# Patient Record
Sex: MALE | Race: BLACK OR AFRICAN AMERICAN | Employment: UNEMPLOYED | URBAN - METROPOLITAN AREA
[De-identification: names, ages, dates, MRNs, and addresses within clinical notes are randomized per-mention and may not be internally consistent; named-entity substitution may affect disease eponyms.]

---

## 2018-06-09 ENCOUNTER — APPOINTMENT (OUTPATIENT)
Dept: GENERAL RADIOLOGY | Age: 78
DRG: 310 | End: 2018-06-09
Attending: EMERGENCY MEDICINE
Payer: MEDICARE

## 2018-06-09 ENCOUNTER — HOSPITAL ENCOUNTER (INPATIENT)
Age: 78
LOS: 1 days | Discharge: LEFT BEFORE DISCHARGE | DRG: 310 | End: 2018-06-09
Attending: EMERGENCY MEDICINE | Admitting: INTERNAL MEDICINE
Payer: MEDICARE

## 2018-06-09 VITALS
HEART RATE: 73 BPM | SYSTOLIC BLOOD PRESSURE: 125 MMHG | RESPIRATION RATE: 16 BRPM | TEMPERATURE: 97.8 F | DIASTOLIC BLOOD PRESSURE: 83 MMHG | OXYGEN SATURATION: 96 %

## 2018-06-09 DIAGNOSIS — I48.91 ATRIAL FIBRILLATION WITH RVR (HCC): Primary | ICD-10-CM

## 2018-06-09 LAB
ALBUMIN SERPL-MCNC: 3.4 G/DL (ref 3.4–5)
ALBUMIN/GLOB SERPL: 0.7 {RATIO} (ref 0.8–1.7)
ALP SERPL-CCNC: 90 U/L (ref 45–117)
ALT SERPL-CCNC: 25 U/L (ref 16–61)
ANION GAP SERPL CALC-SCNC: 7 MMOL/L (ref 3–18)
AST SERPL-CCNC: 35 U/L (ref 15–37)
BASOPHILS # BLD: 0 K/UL (ref 0–0.1)
BASOPHILS NFR BLD: 0 % (ref 0–2)
BILIRUB SERPL-MCNC: 0.5 MG/DL (ref 0.2–1)
BUN SERPL-MCNC: 22 MG/DL (ref 7–18)
BUN/CREAT SERPL: 17 (ref 12–20)
CALCIUM SERPL-MCNC: 9 MG/DL (ref 8.5–10.1)
CHLORIDE SERPL-SCNC: 107 MMOL/L (ref 100–108)
CHOLEST SERPL-MCNC: 174 MG/DL
CK MB CFR SERPL CALC: NORMAL % (ref 0–4)
CK MB SERPL-MCNC: <1 NG/ML (ref 5–25)
CK SERPL-CCNC: 110 U/L (ref 39–308)
CO2 SERPL-SCNC: 24 MMOL/L (ref 21–32)
CREAT SERPL-MCNC: 1.31 MG/DL (ref 0.6–1.3)
DIFFERENTIAL METHOD BLD: ABNORMAL
EOSINOPHIL # BLD: 0 K/UL (ref 0–0.4)
EOSINOPHIL NFR BLD: 0 % (ref 0–5)
ERYTHROCYTE [DISTWIDTH] IN BLOOD BY AUTOMATED COUNT: 15.7 % (ref 11.6–14.5)
GLOBULIN SER CALC-MCNC: 4.9 G/DL (ref 2–4)
GLUCOSE SERPL-MCNC: 135 MG/DL (ref 74–99)
HBA1C MFR BLD: 6 % (ref 4.2–5.6)
HCT VFR BLD AUTO: 42.9 % (ref 36–48)
HDLC SERPL-MCNC: 59 MG/DL (ref 40–60)
HDLC SERPL: 2.9 {RATIO} (ref 0–5)
HGB BLD-MCNC: 14.7 G/DL (ref 13–16)
INR PPP: 2.3 (ref 0.8–1.2)
LDLC SERPL CALC-MCNC: 94.6 MG/DL (ref 0–100)
LIPID PROFILE,FLP: NORMAL
LYMPHOCYTES # BLD: 1.6 K/UL (ref 0.9–3.6)
LYMPHOCYTES NFR BLD: 11 % (ref 21–52)
MAGNESIUM SERPL-MCNC: 2.2 MG/DL (ref 1.6–2.6)
MCH RBC QN AUTO: 29.3 PG (ref 24–34)
MCHC RBC AUTO-ENTMCNC: 34.3 G/DL (ref 31–37)
MCV RBC AUTO: 85.6 FL (ref 74–97)
MONOCYTES # BLD: 0.6 K/UL (ref 0.05–1.2)
MONOCYTES NFR BLD: 4 % (ref 3–10)
NEUTS SEG # BLD: 11.7 K/UL (ref 1.8–8)
NEUTS SEG NFR BLD: 85 % (ref 40–73)
PLATELET # BLD AUTO: 308 K/UL (ref 135–420)
PMV BLD AUTO: 9.8 FL (ref 9.2–11.8)
POTASSIUM SERPL-SCNC: 4.8 MMOL/L (ref 3.5–5.5)
PROT SERPL-MCNC: 8.3 G/DL (ref 6.4–8.2)
PROTHROMBIN TIME: 24.8 SEC (ref 11.5–15.2)
RBC # BLD AUTO: 5.01 M/UL (ref 4.7–5.5)
SODIUM SERPL-SCNC: 138 MMOL/L (ref 136–145)
TRIGL SERPL-MCNC: 102 MG/DL (ref ?–150)
TROPONIN I SERPL-MCNC: <0.02 NG/ML (ref 0–0.04)
TSH SERPL DL<=0.05 MIU/L-ACNC: 2.44 UIU/ML (ref 0.36–3.74)
VLDLC SERPL CALC-MCNC: 20.4 MG/DL
WBC # BLD AUTO: 13.8 K/UL (ref 4.6–13.2)

## 2018-06-09 PROCEDURE — 85025 COMPLETE CBC W/AUTO DIFF WBC: CPT | Performed by: EMERGENCY MEDICINE

## 2018-06-09 PROCEDURE — 74011000258 HC RX REV CODE- 258: Performed by: EMERGENCY MEDICINE

## 2018-06-09 PROCEDURE — 96367 TX/PROPH/DG ADDL SEQ IV INF: CPT

## 2018-06-09 PROCEDURE — 74011250637 HC RX REV CODE- 250/637

## 2018-06-09 PROCEDURE — 83735 ASSAY OF MAGNESIUM: CPT | Performed by: EMERGENCY MEDICINE

## 2018-06-09 PROCEDURE — 85610 PROTHROMBIN TIME: CPT | Performed by: EMERGENCY MEDICINE

## 2018-06-09 PROCEDURE — 99285 EMERGENCY DEPT VISIT HI MDM: CPT

## 2018-06-09 PROCEDURE — 93005 ELECTROCARDIOGRAM TRACING: CPT

## 2018-06-09 PROCEDURE — 80053 COMPREHEN METABOLIC PANEL: CPT | Performed by: EMERGENCY MEDICINE

## 2018-06-09 PROCEDURE — 96365 THER/PROPH/DIAG IV INF INIT: CPT

## 2018-06-09 PROCEDURE — 83036 HEMOGLOBIN GLYCOSYLATED A1C: CPT | Performed by: INTERNAL MEDICINE

## 2018-06-09 PROCEDURE — 74011000250 HC RX REV CODE- 250

## 2018-06-09 PROCEDURE — 80061 LIPID PANEL: CPT | Performed by: INTERNAL MEDICINE

## 2018-06-09 PROCEDURE — 82550 ASSAY OF CK (CPK): CPT | Performed by: EMERGENCY MEDICINE

## 2018-06-09 PROCEDURE — 84443 ASSAY THYROID STIM HORMONE: CPT | Performed by: INTERNAL MEDICINE

## 2018-06-09 PROCEDURE — 71045 X-RAY EXAM CHEST 1 VIEW: CPT

## 2018-06-09 PROCEDURE — 65660000000 HC RM CCU STEPDOWN

## 2018-06-09 PROCEDURE — 74011000250 HC RX REV CODE- 250: Performed by: EMERGENCY MEDICINE

## 2018-06-09 RX ORDER — LISINOPRIL 20 MG/1
40 TABLET ORAL DAILY
Status: CANCELLED | OUTPATIENT
Start: 2018-06-09

## 2018-06-09 RX ORDER — ONDANSETRON 2 MG/ML
4 INJECTION INTRAMUSCULAR; INTRAVENOUS
Status: CANCELLED | OUTPATIENT
Start: 2018-06-09

## 2018-06-09 RX ORDER — ISOSORBIDE MONONITRATE 30 MG/1
30 TABLET, EXTENDED RELEASE ORAL DAILY
Status: CANCELLED | OUTPATIENT
Start: 2018-06-09

## 2018-06-09 RX ORDER — NITROGLYCERIN 40 MG/100ML
0-20 INJECTION INTRAVENOUS
Status: DISCONTINUED | OUTPATIENT
Start: 2018-06-09 | End: 2018-06-09 | Stop reason: HOSPADM

## 2018-06-09 RX ORDER — TAMSULOSIN HYDROCHLORIDE 0.4 MG/1
0.4 CAPSULE ORAL DAILY
Status: CANCELLED | OUTPATIENT
Start: 2018-06-09

## 2018-06-09 RX ORDER — WARFARIN SODIUM 5 MG/1
5 TABLET ORAL DAILY
COMMUNITY

## 2018-06-09 RX ORDER — SODIUM CHLORIDE 0.9 % (FLUSH) 0.9 %
5-10 SYRINGE (ML) INJECTION AS NEEDED
Status: CANCELLED | OUTPATIENT
Start: 2018-06-09

## 2018-06-09 RX ORDER — GABAPENTIN 800 MG/1
800 TABLET ORAL 3 TIMES DAILY
COMMUNITY

## 2018-06-09 RX ORDER — ATORVASTATIN CALCIUM 40 MG/1
40 TABLET, FILM COATED ORAL DAILY
Status: CANCELLED | OUTPATIENT
Start: 2018-06-09

## 2018-06-09 RX ORDER — GABAPENTIN 800 MG/1
800 TABLET ORAL 3 TIMES DAILY
Status: CANCELLED | OUTPATIENT
Start: 2018-06-09

## 2018-06-09 RX ORDER — NITROGLYCERIN 40 MG/100ML
INJECTION INTRAVENOUS
Status: COMPLETED
Start: 2018-06-09 | End: 2018-06-09

## 2018-06-09 RX ORDER — TAMSULOSIN HYDROCHLORIDE 0.4 MG/1
0.4 CAPSULE ORAL DAILY
COMMUNITY

## 2018-06-09 RX ORDER — FINASTERIDE 5 MG/1
5 TABLET, FILM COATED ORAL DAILY
Status: CANCELLED | OUTPATIENT
Start: 2018-06-09

## 2018-06-09 RX ORDER — NITROGLYCERIN 0.4 MG/1
0.4 TABLET SUBLINGUAL
Status: CANCELLED | OUTPATIENT
Start: 2018-06-09

## 2018-06-09 RX ORDER — FINASTERIDE 5 MG/1
5 TABLET, FILM COATED ORAL DAILY
COMMUNITY

## 2018-06-09 RX ORDER — AMLODIPINE BESYLATE 5 MG/1
5 TABLET ORAL DAILY
Status: CANCELLED | OUTPATIENT
Start: 2018-06-09

## 2018-06-09 RX ORDER — PANTOPRAZOLE SODIUM 40 MG/1
40 TABLET, DELAYED RELEASE ORAL DAILY
COMMUNITY

## 2018-06-09 RX ORDER — BISACODYL 5 MG
10 TABLET, DELAYED RELEASE (ENTERIC COATED) ORAL DAILY PRN
Status: CANCELLED | OUTPATIENT
Start: 2018-06-09

## 2018-06-09 RX ORDER — WARFARIN SODIUM 5 MG/1
7.5 TABLET ORAL DAILY
COMMUNITY

## 2018-06-09 RX ORDER — NITROGLYCERIN 0.4 MG/1
0.4 TABLET SUBLINGUAL
Status: COMPLETED | OUTPATIENT
Start: 2018-06-09 | End: 2018-06-09

## 2018-06-09 RX ORDER — AMLODIPINE BESYLATE 10 MG/1
5 TABLET ORAL DAILY
COMMUNITY

## 2018-06-09 RX ORDER — SODIUM CHLORIDE 0.9 % (FLUSH) 0.9 %
5-10 SYRINGE (ML) INJECTION EVERY 8 HOURS
Status: CANCELLED | OUTPATIENT
Start: 2018-06-09

## 2018-06-09 RX ORDER — CLOPIDOGREL BISULFATE 75 MG/1
75 TABLET ORAL DAILY
COMMUNITY

## 2018-06-09 RX ORDER — ISOSORBIDE MONONITRATE 30 MG/1
30 TABLET, EXTENDED RELEASE ORAL DAILY
COMMUNITY

## 2018-06-09 RX ORDER — NITROGLYCERIN 0.4 MG/1
0.4 TABLET SUBLINGUAL
COMMUNITY

## 2018-06-09 RX ORDER — PANTOPRAZOLE SODIUM 40 MG/1
40 TABLET, DELAYED RELEASE ORAL DAILY
Status: CANCELLED | OUTPATIENT
Start: 2018-06-09

## 2018-06-09 RX ORDER — WARFARIN SODIUM 5 MG/1
5 TABLET ORAL EVERY EVENING
Status: DISCONTINUED | OUTPATIENT
Start: 2018-06-09 | End: 2018-06-09 | Stop reason: HOSPADM

## 2018-06-09 RX ORDER — ATORVASTATIN CALCIUM 40 MG/1
40 TABLET, FILM COATED ORAL DAILY
COMMUNITY

## 2018-06-09 RX ORDER — NITROGLYCERIN 0.4 MG/1
TABLET SUBLINGUAL
Status: COMPLETED
Start: 2018-06-09 | End: 2018-06-09

## 2018-06-09 RX ORDER — LISINOPRIL 40 MG/1
40 TABLET ORAL DAILY
COMMUNITY

## 2018-06-09 RX ORDER — ACETAMINOPHEN 325 MG/1
650 TABLET ORAL
Status: CANCELLED | OUTPATIENT
Start: 2018-06-09

## 2018-06-09 RX ORDER — OXYCODONE HYDROCHLORIDE 5 MG/1
5 TABLET ORAL
Status: CANCELLED | OUTPATIENT
Start: 2018-06-09

## 2018-06-09 RX ORDER — CLOPIDOGREL BISULFATE 75 MG/1
75 TABLET ORAL DAILY
Status: CANCELLED | OUTPATIENT
Start: 2018-06-09

## 2018-06-09 RX ORDER — OXYCODONE HYDROCHLORIDE 5 MG/1
5 TABLET ORAL
COMMUNITY

## 2018-06-09 RX ADMIN — NITROGLYCERIN 12 MCG/MIN: 40 INJECTION INTRAVENOUS at 08:12

## 2018-06-09 RX ADMIN — SODIUM CHLORIDE 10 MG/HR: 900 INJECTION, SOLUTION INTRAVENOUS at 07:23

## 2018-06-09 RX ADMIN — NITROGLYCERIN 0.4 MG: 0.4 TABLET SUBLINGUAL at 07:30

## 2018-06-09 NOTE — ED TRIAGE NOTES
Pt brought in by ambulance complaining of chest pain. Nitro x2 given with no change. Pt began vomiting. zofran given and pt denies nausea anymore. A-fib with rvr on the ekg.

## 2018-06-09 NOTE — PROGRESS NOTES
Called by nursing staff because patient wants to leave the hospital AMA. Per nursing patient is angry that his lunch was late and that he does not want the lunch that was provided to him. He refuses to speak to me regarding his decision to leave and the reasons behind it. He states \"Quit using that psychological bull shit to calm me down. I said I'm leaving and I will. \" Reviewed risks of leaving hospital at this time including sudden death, heart failure, syncope, MI. Patient states \"he knows\" and asks for the AMA form to sign. Discussed with nursing. Patient actively pulling out his IV during my conversation with him.

## 2018-06-09 NOTE — ED NOTES
Pt sleeping on stretcher. No acute distress noted. Cardiac monitoring, continuous pulse ox, and blood pressure monitoring in place.

## 2018-06-09 NOTE — ED PROVIDER NOTES
EMERGENCY DEPARTMENT HISTORY AND PHYSICAL EXAM    7:26 AM      Date: 6/9/2018  Patient Name: Maikol Vazquez    History of Presenting Illness     Chief Complaint   Patient presents with    Chest Pain         History Provided By: Patient    Additional History (Context): Maikol Vazquez is a 78 y.o. male with hypertension who presents with 1.5 hours of acute onset severe right chest pain along with nausea, vomiting. Pt reports abrupt onset chest pain with nausea and vomiting, pt reports that he has had similar episode 2 weeks ago and had to be shocked. Pt was given Zofran by EMS. No other concerns or symptoms at this time. PCP: No primary care provider on file. Chief Complaint: chest pain  Duration: 1.5 Hours  Timing:  Acute  Location: right chest  Quality: Pressure  Severity: Severe  Modifying Factors: none  Associated Symptoms: nausea, vomiting      Current Facility-Administered Medications   Medication Dose Route Frequency Provider Last Rate Last Dose    dilTIAZem (CARDIZEM) 100 mg in 0.9% sodium chloride (MBP/ADV) 100 mL infusion  10 mg/hr IntraVENous TITRATE Alberto Disla MD 10 mL/hr at 06/09/18 0723 10 mg/hr at 06/09/18 7949       Past History     Past Medical History:  No past medical history on file. Past Surgical History:  No past surgical history on file. Family History:  No family history on file. Social History:  Social History   Substance Use Topics    Smoking status: Not on file    Smokeless tobacco: Not on file    Alcohol use Not on file       Allergies:  No Known Allergies      Review of Systems     Review of Systems   Cardiovascular: Positive for chest pain. Gastrointestinal: Positive for nausea and vomiting. Negative for abdominal pain. All other systems reviewed and are negative. Physical Exam     Visit Vitals    BP (!) 161/94    Pulse (!) 134    Temp 97.9 °F (36.6 °C)    Resp 16       Physical Exam   Constitutional: He is oriented to person, place, and time.  He appears well-developed. HENT:   Head: Normocephalic and atraumatic. Eyes: EOM are normal. Pupils are equal, round, and reactive to light. Neck: Normal range of motion. Neck supple. Cardiovascular: Normal rate and normal heart sounds. Exam reveals no friction rub. No murmur heard. Irreg irreg  Tachy     Pulmonary/Chest: Effort normal and breath sounds normal. No respiratory distress. He has no wheezes. Abdominal: Soft. He exhibits no distension. There is no tenderness. There is no rebound and no guarding. Musculoskeletal: Normal range of motion. Neurological: He is alert and oriented to person, place, and time. Skin: Skin is warm and dry. Psychiatric: He has a normal mood and affect. His behavior is normal. Thought content normal.         Diagnostic Study Results     EKG shows A. Fib with RVR at a rate of 117 with a left axis and normal intervals except a right bundle branch at 128. There is no ST elevation there is some questionable ST depression laterally. No prior EKGs for comparison. Medical Decision Making       This is a 79-year-old gentleman with a history of paroxysmal A. Fib and prior cardioversion with chest pain on the right side nausea no vomiting no diaphoresis. Going on for about an hour and 1/2 prior to arrival and initial troponin is negative. He is currently on a Cardizem drip initiated nitroglycerin drip. Pending his INR to determine need for anticoagulation. 9:39 AM Right sided cp; on coumadin; INR therapeutic. Diagnosis     No diagnosis found.     _______________________________    Attestations:  Juan J [address] [place] [address] acting as a scribe for and in the presence of Fly Kurtz MD      June 09, 2018 at 7:26 AM       Provider Attestation:      I personally performed the services described in the documentation, reviewed the documentation, as recorded by the scribe in my presence, and it accurately and completely records my words and actions.  June 09, 2018 at 7:26 VERITO Disla MD    _______________________________

## 2018-06-09 NOTE — ED NOTES
TRANSFER - OUT REPORT:    Verbal report given to Virginia Downing RN. (name) on Luis Farfan  being transferred to  (unit) for routine progression of care       Report consisted of patients Situation, Background, Assessment and   Recommendations(SBAR). Information from the following report(s) SBAR, ED Summary, MAR, Recent Results and Cardiac Rhythm A. fibb was reviewed with the receiving nurse. Lines:   Peripheral IV 06/09/18 Left Antecubital (Active)   Site Assessment Clean, dry, & intact 6/9/2018  7:05 AM   Phlebitis Assessment 0 6/9/2018  7:05 AM   Infiltration Assessment 0 6/9/2018  7:05 AM   Dressing Status Clean, dry, & intact 6/9/2018  7:05 AM   Dressing Type Transparent 6/9/2018  7:05 AM   Hub Color/Line Status Flushed;Patent 6/9/2018  7:05 AM   Action Taken Blood drawn 6/9/2018  7:05 AM        Opportunity for questions and clarification was provided.       Patient transported with:   Monitor  Registered Nurse

## 2018-06-09 NOTE — PROGRESS NOTES
Pt agitated that he did not receive lunch. Dr Susi Palomino called regarding diet order and also pt has converted to SB per monitor with heart rate of 51, /67-51-16 Sa02 94%. Pt given lunch- regular cardia diet, pt stated it was not acceptable, and verbalizes that he is going home. Dr. Susi Palomino spoke with pt. Pt leaves AMA. AMA documentation signed by pt. EUGENE ryan.

## 2018-06-09 NOTE — H&P
Date of Admission: 6/9/2018      Assessment:   A.fib with RVR: 1st set of troponins negative; on cardizem drip with rate control.   - no prior imaging or records available for review at this time; Per patient, he has had cardioversion with [facility] about 1 year ago; ha been taking coumadin and plavix; he does not know if he normally is in Afib or NSR wt home. CAD: recent MI with cardiac cath performed about 3 months ago at [facility]; prior CABG x 1 in 1970's; no records available; follows with Dr. Jesusita Barnes in [place]  HTN: currently controlled  Renal insufficiency:  Unclear if acute or chronic; patient unaware of prior renal insufficiency  Mild leukocytosis:  Etiology unclear; no obvious s/s sepsis  BPH with prostate CA: On flomax and proscar; medically managed by urology  Hx of lung CA: unclear as to type or staging; had partial right lobectomy in Dec 2017, pt reports no chemo or radiation, last CT chest was 3 weeks ago, pt not aware of results. PAD:  Prior angio with stenting    Plan:   Continue with cardizem drip  Follow cardiac enzymes  Continue anticoagulation with coumadin- goal INR 2-2.5  Cardiology consulted in ED (Dr. Lazarus Dia)  Follow CBC, BMP  Continue with gentle hydration  Continue home medications: metoprolol, imdur, norvasc. prinivil  Continue Flomax and Proscar  Check lipids, TSH, HBA1c   TTE  Defer concurrent use of Coumadin and Plavix to cardiology- high risk for bleed, continue PPI    Darnell Long D.O. Internal Medicine and Infectious Diseases      Subjective:    Patient is a 78 y. o.male who is being evaluated for A.fib with RVR. Mr. Brianna Bauer is a pleasant 29 yo AAm who is presenting with sudden onset of nausea, chest pain, and shortness of breath. He notes that he is in town from [place] and had woken up last night around 3-4 am with nausea and chest pain. The pain was in the right chest, described as sharp and stabbing and associated with nausea, non-radiating.  He notes that the pain has now resolved as well as the nausea. In the ED, he was noted to have HTN as well as A.fib with RVR. He was started on a cardizem drip with improvement in his symptoms and subsequent rate control. No family history on file. PMHx:   HTN, CAD, BPH, prostate CA, PAD, A.fib (prior cardioversion)    PSHx:   Rotator cuff repair (right)  CABG x 2 (1970's)  Angiography with stenting b/l femoral artertes  Prostate biopsy  Partial lobectomy, right lung Dec 2017    Medications reviewed as below:   Current Facility-Administered Medications   Medication Dose Route Frequency Provider Last Rate Last Dose    dilTIAZem (CARDIZEM) 100 mg in 0.9% sodium chloride (MBP/ADV) 100 mL infusion  10 mg/hr IntraVENous TITRATE Catarino Villalobos MD 10 mL/hr at 06/09/18 0754 10 mg/hr at 06/09/18 0754    nitroglycerin (TRIDIL) 400 mcg/ml infusion  0-20 mcg/min IntraVENous TITRATE Catarino Villalobos MD   Stopped at 06/09/18 0915     Current Outpatient Prescriptions   Medication Sig Dispense Refill    finasteride (PROSCAR) 5 mg tablet Take 5 mg by mouth daily.  warfarin (COUMADIN) 5 mg tablet Take 5 mg by mouth daily. Indications: Sun, Mon, Wed, Fri, Sat      warfarin (COUMADIN) 5 mg tablet Take 7.5 mg by mouth daily. Indications: Tues, Thurs      oxyCODONE IR (ROXICODONE) 5 mg immediate release tablet Take 5 mg by mouth every four (4) hours as needed for Pain.  clopidogrel (PLAVIX) 75 mg tab Take 75 mg by mouth daily.  OTHER Take 12.5 mg by mouth two (2) times a day. Indications: Metoprolol      isosorbide mononitrate ER (IMDUR) 30 mg tablet Take 30 mg by mouth daily.  pantoprazole (PROTONIX) 40 mg tablet Take 40 mg by mouth daily.  amLODIPine (NORVASC) 10 mg tablet Take 5 mg by mouth daily.  lisinopril (PRINIVIL, ZESTRIL) 40 mg tablet Take 40 mg by mouth daily.  atorvastatin (LIPITOR) 40 mg tablet Take 40 mg by mouth daily.  tamsulosin (FLOMAX) 0.4 mg capsule Take 0.4 mg by mouth daily.  gabapentin (NEURONTIN) 800 mg tablet Take 800 mg by mouth three (3) times daily.  nitroglycerin (NITROSTAT) 0.4 mg SL tablet 0.4 mg by SubLINGual route every five (5) minutes as needed for Chest Pain. Up to 3 doses. No Known Allergies  Social History     Social History    Marital status:      Spouse name: N/A    Number of children: N/A    Years of education: N/A     Occupational History    Not on file. Social History Main Topics    Smoking status: Not on file    Smokeless tobacco: Not on file    Alcohol use Not on file    Drug use: Not on file    Sexual activity: Not on file     Other Topics Concern    Not on file     Social History Narrative        Review of Systems    Negative Unless BOLDED    General: fevers, chills, myalgias, arthralgias, unexplained weight loss, malaise, fatigue. HEENT:  headaches,sinus pain or presure, recent URI, recent dental procedures;  tinnitus, hearing loss , visual changes, catarats, dizziness or blurred vision  PUlMONARY:  cough , shortness of breath, sputum production, hx of asthma or COPD. previous treatement for TB or PPD. Cardiovascular: chest pain, previous CAD/MI, vavlular heart disease,  murmurs  GI:   nausea, vomiting, diarrhea, abdominal pain, prior C.diff  :  urinary frequency, dysuria, hematuria, bladder incontinence.    Neurologic:  seizures, syncope or prior CVA/TIA, confusion, memory impairment, neuropathy  Musculoskeletal:  myalgias arthralgias, joint pain/ swelling,  back pain  Skin:  Purities,  recurrent cellulitis,  chronic stasis ulcer, diabetic foot ulcers  Endocrine: polyuria, polydipsia, hair loss, weight gain  Psych: Denies depression or treatment by a psychiatrist/psycologist  Heme-Onc: prior DVT, easy bruising, fatigue, malignancy        Objective:        Visit Vitals    /75    Pulse 80    Temp 97.9 °F (36.6 °C)    Resp 12    SpO2 100%     Temp (24hrs), Av.9 °F (36.6 °C), Min:97.9 °F (36.6 °C), Max:97.9 °F (36.6 °C)        General:   awake alert and oriented   Skin:   no rashes or skin lesions noted on limited exam   HEENT:  Normocephalic, atraumatic, PERRL, EOMI, no scleral icterus or pallor; no conjunctival hemmohage;  nasal and oral mucous are moist and without evidence of lesions. No thrush. Dentition fair- several missing teeth, discoloration noted. Neck supple, no bruits. Lymph Nodes:   no cervical, axillary or inguinal adenopathy   Lungs:   non-labored, bilaterally clear to aspiration- no crackles wheezes rales or rhonchi   Heart:  Irregular, rate controlled, s1 and s2; no murmurs rubs or gallops, no edema, + pedal pulses; mid-line sternal scar   Abdomen:  soft, non-distended, active bowel sounds, no hepatomegaly, no splenomegaly. Non-tender   Genitourinary:  deferred   Extremities:   no clubbing, cyanosis; no joint effusions or swelling; Full ROM of all large joints to the upper and lower extremities; mild tenderness to right shoulder with palpation; muscle mass appropriate for age   Neurologic:  No gross focal sensory abnormalities; 5/5 muscle strength to upper and lower extremities. Speech appropirate. Cranial nerves intact   Psychiatric:   appropriate and interactive. Labs: Results:   Chemistry Recent Labs      06/09/18   0713   GLU  135*   NA  138   K  4.8   CL  107   CO2  24   BUN  22*   CREA  1.31*   CA  9.0   AGAP  7   BUCR  17   AP  90   TP  8.3*   ALB  3.4   GLOB  4.9*   AGRAT  0.7*      CBC w/Diff Recent Labs      06/09/18   0713   WBC  13.8*   RBC  5.01   HGB  14.7   HCT  42.9   PLT  308   GRANS  85*   LYMPH  11*   EOS  0            No results found for: SDES No results found for: CULT   Results for Lia Malone (MRN [id number]) as of 6/9/2018 09:59   Ref. Range 6/9/2018 07:13   INR Latest Ref Range: 0.8 - 1.2   2.3 (H)   Results for Lia Malone (MRN [id number]) as of 6/9/2018 09:59   Ref.  Range 6/9/2018 07:13   CK Latest Ref Range: 39 - 308 U/L 110   CK-MB Index Latest Ref Range: 0.0 - 4.0 % CALCULATION NOT P... CK - MB Latest Ref Range: <3.6 ng/ml <1.0   Troponin-I, Qt. Latest Ref Range: 0.0 - 0.045 NG/ML <0.02       Imagin/9 CXR: Mild bibasilar streaky opacities, favors atelectasis.  Otherwise, no additional  evidence for acute cardiopulmonary process     EKG: Atrial fibrillation with rapid ventricular response   Left axis deviation   Right bundle branch block   Inferior infarct , age undetermined      All imaging reviewed from Admission to present as per radiology interpretation in [address] [place] [address]

## 2018-06-10 LAB
ATRIAL RATE: 131 BPM
CALCULATED R AXIS, ECG10: -78 DEGREES
CALCULATED T AXIS, ECG11: 81 DEGREES
DIAGNOSIS, 93000: NORMAL
Q-T INTERVAL, ECG07: 300 MS
QRS DURATION, ECG06: 128 MS
QTC CALCULATION (BEZET), ECG08: 418 MS
VENTRICULAR RATE, ECG03: 117 BPM